# Patient Record
Sex: FEMALE | Race: WHITE | NOT HISPANIC OR LATINO
[De-identification: names, ages, dates, MRNs, and addresses within clinical notes are randomized per-mention and may not be internally consistent; named-entity substitution may affect disease eponyms.]

---

## 2018-06-07 ENCOUNTER — TRANSCRIPTION ENCOUNTER (OUTPATIENT)
Age: 53
End: 2018-06-07

## 2018-06-12 ENCOUNTER — RECORD ABSTRACTING (OUTPATIENT)
Age: 53
End: 2018-06-12

## 2018-06-12 DIAGNOSIS — R11.0 NAUSEA: ICD-10-CM

## 2018-06-12 DIAGNOSIS — Z23 ENCOUNTER FOR IMMUNIZATION: ICD-10-CM

## 2018-06-12 DIAGNOSIS — Z82.49 FAMILY HISTORY OF ISCHEMIC HEART DISEASE AND OTHER DISEASES OF THE CIRCULATORY SYSTEM: ICD-10-CM

## 2018-06-12 DIAGNOSIS — Z92.89 PERSONAL HISTORY OF OTHER MEDICAL TREATMENT: ICD-10-CM

## 2018-06-22 ENCOUNTER — APPOINTMENT (OUTPATIENT)
Dept: INTERNAL MEDICINE | Facility: CLINIC | Age: 53
End: 2018-06-22

## 2019-01-10 ENCOUNTER — RECORD ABSTRACTING (OUTPATIENT)
Age: 54
End: 2019-01-10

## 2019-01-15 ENCOUNTER — NON-APPOINTMENT (OUTPATIENT)
Age: 54
End: 2019-01-15

## 2019-01-15 ENCOUNTER — APPOINTMENT (OUTPATIENT)
Dept: INTERNAL MEDICINE | Facility: CLINIC | Age: 54
End: 2019-01-15
Payer: COMMERCIAL

## 2019-01-15 VITALS
SYSTOLIC BLOOD PRESSURE: 120 MMHG | DIASTOLIC BLOOD PRESSURE: 82 MMHG | WEIGHT: 138 LBS | HEIGHT: 60 IN | TEMPERATURE: 99 F | BODY MASS INDEX: 27.09 KG/M2

## 2019-01-15 DIAGNOSIS — R07.89 OTHER CHEST PAIN: ICD-10-CM

## 2019-01-15 PROCEDURE — 93000 ELECTROCARDIOGRAM COMPLETE: CPT

## 2019-01-15 PROCEDURE — 99214 OFFICE O/P EST MOD 30 MIN: CPT | Mod: 25

## 2019-01-15 NOTE — HISTORY OF PRESENT ILLNESS
[FreeTextEntry8] : She had hurt her back and has stopped exercising. When she saw her gynecologist in the fall who checked her cholesterol was high. This motivated her to get back into the gym. She's been exercising vigorously and can run up to 2-1/2 miles. Despite that in the last few days she's noticed shortness of breath and dizziness when [place] the city. She had some chest tightness and pressure. She is concerned because her father had a heart attack in his 50s. She has never seen a cardiologist.

## 2019-01-15 NOTE — PHYSICAL EXAM
[No Acute Distress] : no acute distress [Normal Sclera/Conjunctiva] : normal sclera/conjunctiva [Normal Outer Ear/Nose] : the outer ears and nose were normal in appearance [No JVD] : no jugular venous distention [No Respiratory Distress] : no respiratory distress  [Clear to Auscultation] : lungs were clear to auscultation bilaterally [Normal Rate] : normal rate  [Regular Rhythm] : with a regular rhythm [No Carotid Bruits] : no carotid bruits [No Edema] : there was no peripheral edema [Normal Anterior Cervical Nodes] : no anterior cervical lymphadenopathy [Kyphosis] : no kyphosis [Grossly Normal Strength/Tone] : grossly normal strength/tone [No Rash] : no rash [No Focal Deficits] : no focal deficits [Normal Affect] : the affect was normal

## 2019-01-15 NOTE — HEALTH RISK ASSESSMENT
[Patient reported mammogram was normal] : Patient reported mammogram was normal [MammogramDate] : 12/18

## 2019-01-15 NOTE — PLAN
[FreeTextEntry1] : Her EKG is unchanged from prior. She can exercise vigorously without chest pain. Given her family history however I recommend she see a cardiologist and a stress test.\par She will return to office after this for a physical.

## 2019-03-01 ENCOUNTER — APPOINTMENT (OUTPATIENT)
Dept: CARDIOLOGY | Facility: CLINIC | Age: 54
End: 2019-03-01
Payer: COMMERCIAL

## 2019-03-01 ENCOUNTER — NON-APPOINTMENT (OUTPATIENT)
Age: 54
End: 2019-03-01

## 2019-03-01 VITALS
OXYGEN SATURATION: 98 % | HEIGHT: 60 IN | WEIGHT: 145 LBS | DIASTOLIC BLOOD PRESSURE: 72 MMHG | SYSTOLIC BLOOD PRESSURE: 116 MMHG | BODY MASS INDEX: 28.47 KG/M2 | HEART RATE: 76 BPM

## 2019-03-01 DIAGNOSIS — R06.02 SHORTNESS OF BREATH: ICD-10-CM

## 2019-03-01 DIAGNOSIS — Z82.49 FAMILY HISTORY OF ISCHEMIC HEART DISEASE AND OTHER DISEASES OF THE CIRCULATORY SYSTEM: ICD-10-CM

## 2019-03-01 PROCEDURE — 99244 OFF/OP CNSLTJ NEW/EST MOD 40: CPT | Mod: 25

## 2019-03-01 PROCEDURE — 93000 ELECTROCARDIOGRAM COMPLETE: CPT

## 2019-03-01 NOTE — HISTORY OF PRESENT ILLNESS
[FreeTextEntry1] : Dang Finley is a healthy 53 year old woman with a history of being overweight, elevated cholesterol, and family history of premature coronary artery disease / myocardial infarction (father) who presents for cardiology consultation.  She has no known history of heart disease and describes a normal nuclear stress test approximately 10 years ago. However, she has noticed recent onset of dyspnea while climbing [place] in [facility]; dyspnea is occasionally associated with mild lightheadedness / near syncope.  She also describes a sporadic chest discomfort - not typical of angina.  She does not experience dyspnea with other aerobic activities, such as using an elliptical machine or walking on a treadmill -- seems to only occur with climbing stairs.  Symptoms improved with rest.  She describes several psychosocial stressors - work-related + divorce proceedings + parenthood.

## 2019-03-01 NOTE — REVIEW OF SYSTEMS
[Headache] : headache [Eyeglasses] : currently wearing eyeglasses [Shortness Of Breath] : shortness of breath [Dyspnea on exertion] : dyspnea during exertion [Chest Pain] : chest pain [Palpitations] : palpitations [Lower Back Pain] : lower back pain [Under Stress] : under stress [Negative] : Heme/Lymph

## 2019-03-01 NOTE — PHYSICAL EXAM
[Well Groomed] : well groomed [General Appearance - In No Acute Distress] : no acute distress [Eyelids - No Xanthelasma] : the eyelids demonstrated no xanthelasmas [No Oral Pallor] : no oral pallor [FreeTextEntry1] : No carotid bruit [Heart Rate And Rhythm] : heart rate and rhythm were normal [Heart Sounds] : normal S1 and S2 [Murmurs] : no murmurs present [Edema] : no peripheral edema present [Respiration, Rhythm And Depth] : normal respiratory rhythm and effort [Auscultation Breath Sounds / Voice Sounds] : lungs were clear to auscultation bilaterally [Bowel Sounds] : normal bowel sounds [Abdomen Soft] : soft [Abdomen Tenderness] : non-tender [Gait - Sufficient For Exercise Testing] : the gait was sufficient for exercise testing [Nail Clubbing] : no clubbing of the fingernails [Cyanosis, Localized] : no localized cyanosis [] : no rash [Oriented To Time, Place, And Person] : oriented to person, place, and time [Impaired Insight] : insight and judgment were intact [Affect] : the affect was normal [Mood] : the mood was normal

## 2019-03-11 ENCOUNTER — APPOINTMENT (OUTPATIENT)
Dept: INTERNAL MEDICINE | Facility: CLINIC | Age: 54
End: 2019-03-11
Payer: COMMERCIAL

## 2019-03-11 VITALS
TEMPERATURE: 98.1 F | WEIGHT: 138 LBS | SYSTOLIC BLOOD PRESSURE: 100 MMHG | DIASTOLIC BLOOD PRESSURE: 80 MMHG | HEIGHT: 59 IN | BODY MASS INDEX: 27.82 KG/M2

## 2019-03-11 PROCEDURE — 99396 PREV VISIT EST AGE 40-64: CPT | Mod: 25

## 2019-03-11 PROCEDURE — 36415 COLL VENOUS BLD VENIPUNCTURE: CPT

## 2019-03-11 NOTE — PHYSICAL EXAM
[No Acute Distress] : no acute distress [Normal Sclera/Conjunctiva] : normal sclera/conjunctiva [Normal Outer Ear/Nose] : the outer ears and nose were normal in appearance [No JVD] : no jugular venous distention [No Respiratory Distress] : no respiratory distress  [Clear to Auscultation] : lungs were clear to auscultation bilaterally [Normal Rate] : normal rate  [Regular Rhythm] : with a regular rhythm [No Carotid Bruits] : no carotid bruits [No Edema] : there was no peripheral edema [Normal Anterior Cervical Nodes] : no anterior cervical lymphadenopathy [Grossly Normal Strength/Tone] : grossly normal strength/tone [No Rash] : no rash [No Focal Deficits] : no focal deficits [Normal Affect] : the affect was normal [Kyphosis] : no kyphosis

## 2019-03-12 ENCOUNTER — TRANSCRIPTION ENCOUNTER (OUTPATIENT)
Age: 54
End: 2019-03-12

## 2019-03-12 LAB
ALBUMIN SERPL ELPH-MCNC: 4.6 G/DL
ALP BLD-CCNC: 65 U/L
ALT SERPL-CCNC: 24 U/L
ANION GAP SERPL CALC-SCNC: 12 MMOL/L
APPEARANCE: CLEAR
AST SERPL-CCNC: 17 U/L
BACTERIA: NEGATIVE
BASOPHILS # BLD AUTO: 0.05 K/UL
BASOPHILS NFR BLD AUTO: 0.6 %
BILIRUB SERPL-MCNC: 0.3 MG/DL
BILIRUBIN URINE: NEGATIVE
BLOOD URINE: NEGATIVE
BUN SERPL-MCNC: 13 MG/DL
CALCIUM SERPL-MCNC: 9.6 MG/DL
CHLORIDE SERPL-SCNC: 104 MMOL/L
CHOLEST SERPL-MCNC: 215 MG/DL
CHOLEST/HDLC SERPL: 3.6 RATIO
CO2 SERPL-SCNC: 24 MMOL/L
COLOR: YELLOW
CREAT SERPL-MCNC: 0.91 MG/DL
EOSINOPHIL # BLD AUTO: 0.1 K/UL
EOSINOPHIL NFR BLD AUTO: 1.1 %
GLUCOSE QUALITATIVE U: NEGATIVE
GLUCOSE SERPL-MCNC: 88 MG/DL
HBA1C MFR BLD HPLC: 5 %
HCT VFR BLD CALC: 45.6 %
HDLC SERPL-MCNC: 60 MG/DL
HGB BLD-MCNC: 14.4 G/DL
HYALINE CASTS: 3 /LPF
IMM GRANULOCYTES NFR BLD AUTO: 0.2 %
KETONES URINE: NEGATIVE
LDLC SERPL CALC-MCNC: 140 MG/DL
LEUKOCYTE ESTERASE URINE: NEGATIVE
LYMPHOCYTES # BLD AUTO: 1.78 K/UL
LYMPHOCYTES NFR BLD AUTO: 19.9 %
MAN DIFF?: NORMAL
MCHC RBC-ENTMCNC: 28.3 PG
MCHC RBC-ENTMCNC: 31.6 GM/DL
MCV RBC AUTO: 89.8 FL
MICROSCOPIC-UA: NORMAL
MONOCYTES # BLD AUTO: 0.63 K/UL
MONOCYTES NFR BLD AUTO: 7.1 %
NEUTROPHILS # BLD AUTO: 6.35 K/UL
NEUTROPHILS NFR BLD AUTO: 71.1 %
NITRITE URINE: NEGATIVE
PH URINE: 5.5
PLATELET # BLD AUTO: 283 K/UL
POTASSIUM SERPL-SCNC: 4.8 MMOL/L
PROT SERPL-MCNC: 7.3 G/DL
PROTEIN URINE: NORMAL
RBC # BLD: 5.08 M/UL
RBC # FLD: 13.6 %
RED BLOOD CELLS URINE: 6 /HPF
SODIUM SERPL-SCNC: 140 MMOL/L
SPECIFIC GRAVITY URINE: 1.03
SQUAMOUS EPITHELIAL CELLS: 4 /HPF
TRIGL SERPL-MCNC: 74 MG/DL
TSH SERPL-ACNC: 1.64 UIU/ML
UROBILINOGEN URINE: NORMAL
VIT B12 SERPL-MCNC: 496 PG/ML
WBC # FLD AUTO: 8.93 K/UL
WHITE BLOOD CELLS URINE: 4 /HPF

## 2019-03-13 ENCOUNTER — RESULT REVIEW (OUTPATIENT)
Age: 54
End: 2019-03-13

## 2019-03-15 ENCOUNTER — MESSAGE (OUTPATIENT)
Age: 54
End: 2019-03-15

## 2019-03-27 ENCOUNTER — APPOINTMENT (OUTPATIENT)
Dept: CARDIOLOGY | Facility: CLINIC | Age: 54
End: 2019-03-27
Payer: COMMERCIAL

## 2019-03-27 PROCEDURE — 93015 CV STRESS TEST SUPVJ I&R: CPT

## 2019-03-27 PROCEDURE — 93306 TTE W/DOPPLER COMPLETE: CPT

## 2020-04-14 ENCOUNTER — APPOINTMENT (OUTPATIENT)
Dept: INTERNAL MEDICINE | Facility: CLINIC | Age: 55
End: 2020-04-14
Payer: COMMERCIAL

## 2020-04-14 VITALS — DIASTOLIC BLOOD PRESSURE: 80 MMHG | OXYGEN SATURATION: 99 % | SYSTOLIC BLOOD PRESSURE: 138 MMHG | HEART RATE: 68 BPM

## 2020-04-14 VITALS
TEMPERATURE: 97.8 F | SYSTOLIC BLOOD PRESSURE: 140 MMHG | DIASTOLIC BLOOD PRESSURE: 90 MMHG | WEIGHT: 138 LBS | BODY MASS INDEX: 27.82 KG/M2 | HEIGHT: 59 IN

## 2020-04-14 DIAGNOSIS — E28.2 POLYCYSTIC OVARIAN SYNDROME: ICD-10-CM

## 2020-04-14 DIAGNOSIS — L65.9 NONSCARRING HAIR LOSS, UNSPECIFIED: ICD-10-CM

## 2020-04-14 PROCEDURE — 99214 OFFICE O/P EST MOD 30 MIN: CPT | Mod: 25

## 2020-04-14 PROCEDURE — 36415 COLL VENOUS BLD VENIPUNCTURE: CPT

## 2020-04-14 RX ORDER — MELOXICAM 15 MG/1
15 TABLET ORAL
Refills: 0 | Status: DISCONTINUED | COMMUNITY
End: 2020-04-14

## 2020-04-14 NOTE — REVIEW OF SYSTEMS
[Negative] : Heme/Lymph [Dysuria] : no dysuria [Incontinence] : no incontinence [Hematuria] : no hematuria [Frequency] : frequency [Suicidal] : not suicidal [Insomnia] : no insomnia [Anxiety] : anxiety [Depression] : no depression

## 2020-04-14 NOTE — PLAN
[FreeTextEntry1] : I suspect her hair loss is due to emotional stress.\par To be thorough, will order comprehensive lab workup to rule out metabolic etiology.\par She had mild hyperlipidemia last yr - recheck lipid profile and A1c.\par

## 2020-04-14 NOTE — PHYSICAL EXAM
[No Acute Distress] : no acute distress [No Respiratory Distress] : no respiratory distress  [Clear to Auscultation] : lungs were clear to auscultation bilaterally [Normal Rate] : normal rate  [Regular Rhythm] : with a regular rhythm [No Carotid Bruits] : no carotid bruits [No Edema] : there was no peripheral edema [Grossly Normal Strength/Tone] : grossly normal strength/tone [No Rash] : no rash [Normal Affect] : the affect was normal [Well Nourished] : well nourished [Well-Appearing] : well-appearing [Supple] : supple [No Lymphadenopathy] : no lymphadenopathy [Normal S1, S2] : normal S1 and S2 [No Murmur] : no murmur heard [Soft] : abdomen soft [Non Tender] : non-tender [Normal Gait] : normal gait [Normal Mood] : the mood was normal [de-identified] : friendly

## 2020-04-20 LAB
ALBUMIN SERPL ELPH-MCNC: 4.8 G/DL
ALP BLD-CCNC: 70 U/L
ALT SERPL-CCNC: 22 U/L
ANION GAP SERPL CALC-SCNC: 13 MMOL/L
AST SERPL-CCNC: 18 U/L
BASOPHILS # BLD AUTO: 0.04 K/UL
BASOPHILS NFR BLD AUTO: 0.5 %
BILIRUB SERPL-MCNC: 0.4 MG/DL
BUN SERPL-MCNC: 16 MG/DL
CALCIUM SERPL-MCNC: 10.1 MG/DL
CHLORIDE SERPL-SCNC: 104 MMOL/L
CHOLEST SERPL-MCNC: 225 MG/DL
CHOLEST/HDLC SERPL: 3.8 RATIO
CO2 SERPL-SCNC: 23 MMOL/L
CREAT SERPL-MCNC: 0.94 MG/DL
DHEA-S SERPL-MCNC: 109 UG/DL
EOSINOPHIL # BLD AUTO: 0.06 K/UL
EOSINOPHIL NFR BLD AUTO: 0.7 %
ESTIMATED AVERAGE GLUCOSE: 97 MG/DL
ESTRADIOL SERPL-MCNC: 35 PG/ML
GLUCOSE SERPL-MCNC: 85 MG/DL
HBA1C MFR BLD HPLC: 5 %
HCT VFR BLD CALC: 43.1 %
HDLC SERPL-MCNC: 60 MG/DL
HGB BLD-MCNC: 13.9 G/DL
IMM GRANULOCYTES NFR BLD AUTO: 0.4 %
LDLC SERPL CALC-MCNC: 144 MG/DL
LYMPHOCYTES # BLD AUTO: 1.51 K/UL
LYMPHOCYTES NFR BLD AUTO: 17.8 %
MAN DIFF?: NORMAL
MCHC RBC-ENTMCNC: 28.4 PG
MCHC RBC-ENTMCNC: 32.3 GM/DL
MCV RBC AUTO: 88.1 FL
MONOCYTES # BLD AUTO: 0.41 K/UL
MONOCYTES NFR BLD AUTO: 4.8 %
NEUTROPHILS # BLD AUTO: 6.41 K/UL
NEUTROPHILS NFR BLD AUTO: 75.8 %
PLATELET # BLD AUTO: 389 K/UL
POTASSIUM SERPL-SCNC: 5 MMOL/L
PROT SERPL-MCNC: 7.2 G/DL
RBC # BLD: 4.89 M/UL
RBC # FLD: 13.9 %
SODIUM SERPL-SCNC: 140 MMOL/L
TESTOST BND SERPL-MCNC: 0.9 PG/ML
TESTOST SERPL-MCNC: 18.7 NG/DL
TRIGL SERPL-MCNC: 106 MG/DL
TSH SERPL-ACNC: 1.17 UIU/ML
VIT B12 SERPL-MCNC: 597 PG/ML
WBC # FLD AUTO: 8.46 K/UL

## 2020-04-21 ENCOUNTER — APPOINTMENT (OUTPATIENT)
Dept: INTERNAL MEDICINE | Facility: CLINIC | Age: 55
End: 2020-04-21

## 2020-05-18 DIAGNOSIS — Z11.59 ENCOUNTER FOR SCREENING FOR OTHER VIRAL DISEASES: ICD-10-CM

## 2020-06-02 ENCOUNTER — TRANSCRIPTION ENCOUNTER (OUTPATIENT)
Age: 55
End: 2020-06-02

## 2020-10-11 NOTE — H&P PST ADULT - HISTORY OF PRESENT ILLNESS
pt is a 54 y/o wf, [id number], lmp in september, with regular menses at age 55, for endometrial sampling due to this history. As 3 previous c/s, for sampling under anesthesia.   pmh: adult form congenital adrenal hyperplasia, pcos, cervical dysplasia, hypercholessterolinemia  psh: Hleep, 3 c/s  nkda  Denied tobacco use  FH: Dad myocardial infarction  OB/GYN: Menarche age 13 with monthly cycles lasting 5 days. No h/o ovarian cysts or myomas. Cervical dysplasia in 1993 treated by hleep, Pap 12/2019 wnl and hpv neg. 3 c/s.  exam:ht 4-10-1/2, weight 143.5. bp 90/60. Heart reg rate and rhythym, Lungs cta . Abd soft, nt,nd s rbnd or guard. Ext: no cyanosis/clibbing/edema. Breast wnl, Neck wnl. Pelvic: nl ext fren, nl cervix/uterus/adnexa.

## 2020-10-13 ENCOUNTER — OUTPATIENT (OUTPATIENT)
Dept: OUTPATIENT SERVICES | Facility: HOSPITAL | Age: 55
LOS: 1 days | End: 2020-10-13
Payer: COMMERCIAL

## 2020-10-13 DIAGNOSIS — Z11.59 ENCOUNTER FOR SCREENING FOR OTHER VIRAL DISEASES: ICD-10-CM

## 2020-10-13 LAB — SARS-COV-2 RNA SPEC QL NAA+PROBE: SIGNIFICANT CHANGE UP

## 2020-10-13 PROCEDURE — U0003: CPT

## 2020-10-14 ENCOUNTER — TRANSCRIPTION ENCOUNTER (OUTPATIENT)
Age: 55
End: 2020-10-14

## 2020-10-15 ENCOUNTER — RESULT REVIEW (OUTPATIENT)
Age: 55
End: 2020-10-15

## 2020-10-15 ENCOUNTER — OUTPATIENT (OUTPATIENT)
Dept: OUTPATIENT SERVICES | Facility: HOSPITAL | Age: 55
LOS: 1 days | End: 2020-10-15
Payer: COMMERCIAL

## 2020-10-15 VITALS
TEMPERATURE: 98 F | SYSTOLIC BLOOD PRESSURE: 107 MMHG | HEART RATE: 60 BPM | OXYGEN SATURATION: 97 % | DIASTOLIC BLOOD PRESSURE: 64 MMHG | RESPIRATION RATE: 15 BRPM

## 2020-10-15 VITALS
HEIGHT: 59 IN | RESPIRATION RATE: 14 BRPM | TEMPERATURE: 98 F | WEIGHT: 138.01 LBS | SYSTOLIC BLOOD PRESSURE: 147 MMHG | HEART RATE: 56 BPM | DIASTOLIC BLOOD PRESSURE: 79 MMHG | OXYGEN SATURATION: 99 %

## 2020-10-15 DIAGNOSIS — N95.0 POSTMENOPAUSAL BLEEDING: ICD-10-CM

## 2020-10-15 LAB — HCG UR QL: NEGATIVE — SIGNIFICANT CHANGE UP

## 2020-10-15 PROCEDURE — 88305 TISSUE EXAM BY PATHOLOGIST: CPT

## 2020-10-15 PROCEDURE — 86900 BLOOD TYPING SEROLOGIC ABO: CPT

## 2020-10-15 PROCEDURE — 86901 BLOOD TYPING SEROLOGIC RH(D): CPT

## 2020-10-15 PROCEDURE — 58558 HYSTEROSCOPY BIOPSY: CPT

## 2020-10-15 PROCEDURE — 86850 RBC ANTIBODY SCREEN: CPT

## 2020-10-15 PROCEDURE — 36415 COLL VENOUS BLD VENIPUNCTURE: CPT

## 2020-10-15 PROCEDURE — 88305 TISSUE EXAM BY PATHOLOGIST: CPT | Mod: 26

## 2020-10-15 PROCEDURE — 81025 URINE PREGNANCY TEST: CPT

## 2020-10-15 RX ORDER — SODIUM CHLORIDE 9 MG/ML
1000 INJECTION, SOLUTION INTRAVENOUS
Refills: 0 | Status: DISCONTINUED | OUTPATIENT
Start: 2020-10-15 | End: 2020-10-15

## 2020-10-15 RX ORDER — ONDANSETRON 8 MG/1
4 TABLET, FILM COATED ORAL ONCE
Refills: 0 | Status: DISCONTINUED | OUTPATIENT
Start: 2020-10-15 | End: 2020-10-15

## 2020-10-15 RX ORDER — OXYCODONE HYDROCHLORIDE 5 MG/1
5 TABLET ORAL ONCE
Refills: 0 | Status: DISCONTINUED | OUTPATIENT
Start: 2020-10-15 | End: 2020-10-15

## 2020-10-15 RX ORDER — HYDROMORPHONE HYDROCHLORIDE 2 MG/ML
0.5 INJECTION INTRAMUSCULAR; INTRAVENOUS; SUBCUTANEOUS
Refills: 0 | Status: DISCONTINUED | OUTPATIENT
Start: 2020-10-15 | End: 2020-10-15

## 2020-10-15 RX ADMIN — SODIUM CHLORIDE 60 MILLILITER(S): 9 INJECTION, SOLUTION INTRAVENOUS at 09:43

## 2020-10-15 NOTE — BRIEF OPERATIVE NOTE - NSICDXBRIEFPOSTOP_GEN_ALL_CORE_FT
POST-OP DIAGNOSIS:  Polyp, cervix 15-Oct-2020 11:53:54  Param Pulido  Postmenopause bleeding 15-Oct-2020 11:53:43  Param Pulido

## 2020-10-15 NOTE — BRIEF OPERATIVE NOTE - NSICDXBRIEFPROCEDURE_GEN_ALL_CORE_FT
PROCEDURES:  Cervical polypectomy 15-Oct-2020 11:53:16  Param Pulido  Hysteroscopy with biopsy 15-Oct-2020 11:53:01  Param Pulido

## 2020-10-15 NOTE — ASU DISCHARGE PLAN (ADULT/PEDIATRIC) - CARE PROVIDER_API CALL
Param Pulido  OBSTETRICS AND GYNECOLOGY  [address] [place] [address]  Phone: [phone]  Fax: [phone]  Follow Up Time:

## 2020-10-16 LAB — SURGICAL PATHOLOGY STUDY: SIGNIFICANT CHANGE UP

## 2020-11-09 ENCOUNTER — NON-APPOINTMENT (OUTPATIENT)
Age: 55
End: 2020-11-09

## 2020-11-09 ENCOUNTER — APPOINTMENT (OUTPATIENT)
Dept: INTERNAL MEDICINE | Facility: CLINIC | Age: 55
End: 2020-11-09
Payer: COMMERCIAL

## 2020-11-09 VITALS
OXYGEN SATURATION: 99 % | HEIGHT: 59 IN | SYSTOLIC BLOOD PRESSURE: 110 MMHG | HEART RATE: 60 BPM | BODY MASS INDEX: 27.82 KG/M2 | WEIGHT: 138 LBS | DIASTOLIC BLOOD PRESSURE: 80 MMHG | TEMPERATURE: 97.8 F | RESPIRATION RATE: 18 BRPM

## 2020-11-09 DIAGNOSIS — Z63.5 DISRUPTION OF FAMILY BY SEPARATION AND DIVORCE: ICD-10-CM

## 2020-11-09 DIAGNOSIS — Z01.84 ENCOUNTER FOR ANTIBODY RESPONSE EXAMINATION: ICD-10-CM

## 2020-11-09 PROCEDURE — 36415 COLL VENOUS BLD VENIPUNCTURE: CPT

## 2020-11-09 PROCEDURE — 99072 ADDL SUPL MATRL&STAF TM PHE: CPT

## 2020-11-09 PROCEDURE — 99396 PREV VISIT EST AGE 40-64: CPT | Mod: 25

## 2020-11-09 PROCEDURE — 93000 ELECTROCARDIOGRAM COMPLETE: CPT

## 2020-11-09 SDOH — SOCIAL STABILITY - SOCIAL INSECURITY: DISRUPTION OF FAMILY BY SEPARATION AND DIVORCE: Z63.5

## 2020-11-09 NOTE — PHYSICAL EXAM
[No Acute Distress] : no acute distress [Well Nourished] : well nourished [Well-Appearing] : well-appearing [Normal Sclera/Conjunctiva] : normal sclera/conjunctiva [EOMI] : extraocular movements intact [Normal Outer Ear/Nose] : the outer ears and nose were normal in appearance [Normal TMs] : both tympanic membranes were normal [Supple] : supple [No Lymphadenopathy] : no lymphadenopathy [No Respiratory Distress] : no respiratory distress  [Clear to Auscultation] : lungs were clear to auscultation bilaterally [Normal Rate] : normal rate  [Regular Rhythm] : with a regular rhythm [Normal S1, S2] : normal S1 and S2 [No Murmur] : no murmur heard [No Carotid Bruits] : no carotid bruits [No Edema] : there was no peripheral edema [Soft] : abdomen soft [Non Tender] : non-tender [Normal Posterior Cervical Nodes] : no posterior cervical lymphadenopathy [Normal Anterior Cervical Nodes] : no anterior cervical lymphadenopathy [No Spinal Tenderness] : no spinal tenderness [No Joint Swelling] : no joint swelling [Grossly Normal Strength/Tone] : grossly normal strength/tone [No Rash] : no rash [Normal Gait] : normal gait [Normal Affect] : the affect was normal [Normal Mood] : the mood was normal [de-identified] : friendly [de-identified] : defer to GYN

## 2020-11-09 NOTE — HISTORY OF PRESENT ILLNESS
[FreeTextEntry1] : physical [de-identified] : Patient comes for an annual exam.\par \par She had D&C last month for vaginal bleeding, found benign endocervix polyp. Still has regular menses. \par She is due to see her dermatologist Dr. Perry. \par She has GERD symptoms including abnormal sensation in throat. Had EGD in 2016 which was reportedly normal. She does not take medication. She watches diet but could do better.\par Her hair loss has resolved. She attributed it to stress.

## 2020-11-09 NOTE — PLAN
[FreeTextEntry1] : Check routine fasting labs.\par Discussed diet, exercise, and weight maintenance. \par She declined flu shot.\par Scheduled for mammogram next month, will send us the report. Also seeing GYN next month for annual.\par Colonoscopy 2016 - likely repeat 2026.\par She has mild hyperlipidemia, check fasting lipid panel. She will continue to watch diet, continue exercise.\par She likely has GERD, start trial of Pepcid 40 mg daily. GERD diet precautions discussed.\par \par RTO in 1 yr for annual exam or earlier PRN for acute care.\par \par \par

## 2020-11-09 NOTE — HEALTH RISK ASSESSMENT
[Excellent] : ~his/her~  mood as  excellent [No] : In the past 12 months have you used drugs other than those required for medical reasons? No [No falls in past year] : Patient reported no falls in the past year [0] : 2) Feeling down, depressed, or hopeless: Not at all (0) [HIV test declined] : HIV test declined [Patient reported colonoscopy was normal] : Patient reported colonoscopy was normal [Patient reported mammogram was normal] : Patient reported mammogram was normal [Patient reported PAP Smear was normal] : Patient reported PAP Smear was normal [] : No [de-identified] : running, weights [MammogramDate] : 12/19 [PapSmearDate] : 12/19 [ColonoscopyDate] : 10/16 [HepatitisCDate] : 09/17

## 2020-11-11 ENCOUNTER — TRANSCRIPTION ENCOUNTER (OUTPATIENT)
Age: 55
End: 2020-11-11

## 2020-11-11 LAB
25(OH)D3 SERPL-MCNC: 30.6 NG/ML
ALBUMIN SERPL ELPH-MCNC: 4.6 G/DL
ALP BLD-CCNC: 76 U/L
ALT SERPL-CCNC: 18 U/L
ANION GAP SERPL CALC-SCNC: 9 MMOL/L
APPEARANCE: CLEAR
AST SERPL-CCNC: 16 U/L
BACTERIA: NEGATIVE
BASOPHILS # BLD AUTO: 0.05 K/UL
BASOPHILS NFR BLD AUTO: 0.6 %
BILIRUB SERPL-MCNC: 0.4 MG/DL
BILIRUBIN URINE: NEGATIVE
BLOOD URINE: NORMAL
BUN SERPL-MCNC: 13 MG/DL
CALCIUM SERPL-MCNC: 9.3 MG/DL
CHLORIDE SERPL-SCNC: 105 MMOL/L
CHOLEST SERPL-MCNC: 241 MG/DL
CO2 SERPL-SCNC: 24 MMOL/L
COLOR: NORMAL
CREAT SERPL-MCNC: 0.86 MG/DL
EOSINOPHIL # BLD AUTO: 0.06 K/UL
EOSINOPHIL NFR BLD AUTO: 0.8 %
ESTIMATED AVERAGE GLUCOSE: 103 MG/DL
GLUCOSE QUALITATIVE U: NEGATIVE
GLUCOSE SERPL-MCNC: 89 MG/DL
HBA1C MFR BLD HPLC: 5.2 %
HCT VFR BLD CALC: 43.1 %
HDLC SERPL-MCNC: 61 MG/DL
HGB BLD-MCNC: 14.1 G/DL
HYALINE CASTS: 1 /LPF
IMM GRANULOCYTES NFR BLD AUTO: 0.1 %
KETONES URINE: NEGATIVE
LDLC SERPL CALC-MCNC: 166 MG/DL
LEUKOCYTE ESTERASE URINE: NEGATIVE
LYMPHOCYTES # BLD AUTO: 2.28 K/UL
LYMPHOCYTES NFR BLD AUTO: 28.9 %
MAN DIFF?: NORMAL
MCHC RBC-ENTMCNC: 29.3 PG
MCHC RBC-ENTMCNC: 32.7 GM/DL
MCV RBC AUTO: 89.6 FL
MICROSCOPIC-UA: NORMAL
MONOCYTES # BLD AUTO: 0.48 K/UL
MONOCYTES NFR BLD AUTO: 6.1 %
NEUTROPHILS # BLD AUTO: 5 K/UL
NEUTROPHILS NFR BLD AUTO: 63.5 %
NITRITE URINE: NEGATIVE
NONHDLC SERPL-MCNC: 180 MG/DL
PH URINE: 5.5
PLATELET # BLD AUTO: 396 K/UL
POTASSIUM SERPL-SCNC: 4.3 MMOL/L
PROT SERPL-MCNC: 6.9 G/DL
PROTEIN URINE: NEGATIVE
RBC # BLD: 4.81 M/UL
RBC # FLD: 13.9 %
RED BLOOD CELLS URINE: 4 /HPF
SARS-COV-2 IGG SERPL IA-ACNC: 0.09 INDEX
SARS-COV-2 IGG SERPL QL IA: NEGATIVE
SODIUM SERPL-SCNC: 138 MMOL/L
SPECIFIC GRAVITY URINE: 1.03
SQUAMOUS EPITHELIAL CELLS: 1 /HPF
TRIGL SERPL-MCNC: 72 MG/DL
TSH SERPL-ACNC: 1.23 UIU/ML
UROBILINOGEN URINE: NORMAL
VIT B12 SERPL-MCNC: 458 PG/ML
WBC # FLD AUTO: 7.88 K/UL
WHITE BLOOD CELLS URINE: 1 /HPF

## 2020-11-13 ENCOUNTER — TRANSCRIPTION ENCOUNTER (OUTPATIENT)
Age: 55
End: 2020-11-13

## 2021-01-05 ENCOUNTER — APPOINTMENT (OUTPATIENT)
Dept: INTERNAL MEDICINE | Facility: CLINIC | Age: 56
End: 2021-01-05
Payer: COMMERCIAL

## 2021-01-05 ENCOUNTER — TRANSCRIPTION ENCOUNTER (OUTPATIENT)
Age: 56
End: 2021-01-05

## 2021-01-05 VITALS
SYSTOLIC BLOOD PRESSURE: 130 MMHG | RESPIRATION RATE: 14 BRPM | DIASTOLIC BLOOD PRESSURE: 80 MMHG | OXYGEN SATURATION: 96 % | TEMPERATURE: 100.1 F | HEART RATE: 92 BPM

## 2021-01-05 DIAGNOSIS — Z03.818 ENCOUNTER FOR OBSERVATION FOR SUSPECTED EXPOSURE TO OTHER BIOLOGICAL AGENTS RULED OUT: ICD-10-CM

## 2021-01-05 DIAGNOSIS — R50.9 FEVER, UNSPECIFIED: ICD-10-CM

## 2021-01-05 PROCEDURE — 99072 ADDL SUPL MATRL&STAF TM PHE: CPT

## 2021-01-05 PROCEDURE — 99213 OFFICE O/P EST LOW 20 MIN: CPT

## 2021-01-05 NOTE — HISTORY OF PRESENT ILLNESS
[Patient presents to the office today for COVID-19 evaluation and testing.] : Patient presents to the office today for COVID-19 evaluation and testing. [With Confirmed Case] : patient exposed to a confirmed case of COVID-19 [None] : none [Clear] : clear [Speaks in full sentences] : speaks in full sentences [Normal O2 sat at rest] : normal O2 sat at rest [Grossly normal, interacts, not tired or weak] : grossly normal, interacts, not tired or weak [Discharged with current Quarantine instructions and advised of signs of worsening illness.] : Patient discharged with current quarantine instructions and advised of signs of worsening illness. Patient told to seek emergent care if symptoms occur. [] : mild cough [FreeTextEntry1] : Patient comes for COVID testing. She was exposed to her brother on [address] who tested positive for COVID-19 on 12/30. She had PCR test on 12/31 and was negative on 1/3. She has headache, sore throat, and sinus pressure. Has ear pressure. She tried Motrin but without relief. She developed mild cough last night. [TextBox_107] : Send RVP w/ COVID PCR.\par Advised on quarantine x 10 days if COVID positive. \par Use NSAID's for fever and body aches.\par Rest and drink fluids.\par Call office PRN with any worsening sx or other concerns.

## 2021-01-06 ENCOUNTER — TRANSCRIPTION ENCOUNTER (OUTPATIENT)
Age: 56
End: 2021-01-06

## 2021-01-06 LAB
RAPID RVP RESULT: DETECTED
SARS-COV-2 RNA PNL RESP NAA+PROBE: DETECTED

## 2021-01-07 ENCOUNTER — TRANSCRIPTION ENCOUNTER (OUTPATIENT)
Age: 56
End: 2021-01-07

## 2021-01-11 ENCOUNTER — TRANSCRIPTION ENCOUNTER (OUTPATIENT)
Age: 56
End: 2021-01-11

## 2021-01-11 DIAGNOSIS — J32.9 CHRONIC SINUSITIS, UNSPECIFIED: ICD-10-CM

## 2021-01-14 ENCOUNTER — TRANSCRIPTION ENCOUNTER (OUTPATIENT)
Age: 56
End: 2021-01-14

## 2021-03-07 ENCOUNTER — TRANSCRIPTION ENCOUNTER (OUTPATIENT)
Age: 56
End: 2021-03-07

## 2021-03-08 ENCOUNTER — TRANSCRIPTION ENCOUNTER (OUTPATIENT)
Age: 56
End: 2021-03-08

## 2021-03-09 ENCOUNTER — NON-APPOINTMENT (OUTPATIENT)
Age: 56
End: 2021-03-09

## 2021-03-09 ENCOUNTER — TRANSCRIPTION ENCOUNTER (OUTPATIENT)
Age: 56
End: 2021-03-09

## 2021-03-10 ENCOUNTER — APPOINTMENT (OUTPATIENT)
Dept: CARDIOLOGY | Facility: CLINIC | Age: 56
End: 2021-03-10
Payer: COMMERCIAL

## 2021-03-10 ENCOUNTER — NON-APPOINTMENT (OUTPATIENT)
Age: 56
End: 2021-03-10

## 2021-03-10 VITALS
RESPIRATION RATE: 14 BRPM | WEIGHT: 138 LBS | OXYGEN SATURATION: 100 % | SYSTOLIC BLOOD PRESSURE: 132 MMHG | BODY MASS INDEX: 27.82 KG/M2 | HEIGHT: 59 IN | HEART RATE: 66 BPM | DIASTOLIC BLOOD PRESSURE: 80 MMHG

## 2021-03-10 VITALS
HEART RATE: 66 BPM | BODY MASS INDEX: 27.82 KG/M2 | OXYGEN SATURATION: 100 % | WEIGHT: 138 LBS | SYSTOLIC BLOOD PRESSURE: 150 MMHG | DIASTOLIC BLOOD PRESSURE: 91 MMHG | HEIGHT: 59 IN

## 2021-03-10 VITALS — DIASTOLIC BLOOD PRESSURE: 80 MMHG | SYSTOLIC BLOOD PRESSURE: 132 MMHG

## 2021-03-10 PROCEDURE — 99072 ADDL SUPL MATRL&STAF TM PHE: CPT

## 2021-03-10 PROCEDURE — 99214 OFFICE O/P EST MOD 30 MIN: CPT

## 2021-03-10 PROCEDURE — 93000 ELECTROCARDIOGRAM COMPLETE: CPT

## 2021-03-10 NOTE — PHYSICAL EXAM
[Well Groomed] : well groomed [General Appearance - In No Acute Distress] : no acute distress [Eyelids - No Xanthelasma] : the eyelids demonstrated no xanthelasmas [FreeTextEntry1] : No JVD [Respiration, Rhythm And Depth] : normal respiratory rhythm and effort [Auscultation Breath Sounds / Voice Sounds] : lungs were clear to auscultation bilaterally [Heart Rate And Rhythm] : heart rate and rhythm were normal [Heart Sounds] : normal S1 and S2 [Gait - Sufficient For Exercise Testing] : the gait was sufficient for exercise testing [Skin Color & Pigmentation] : normal skin color and pigmentation [Oriented To Time, Place, And Person] : oriented to person, place, and time [Impaired Insight] : insight and judgment were intact

## 2021-03-10 NOTE — REVIEW OF SYSTEMS
[see HPI] : see HPI [Dyspnea on exertion] : not dyspnea during exertion [Chest Pain] : chest pain [Palpitations] : no palpitations [Negative] : Heme/Lymph

## 2021-03-10 NOTE — HISTORY OF PRESENT ILLNESS
[FreeTextEntry1] : Dang Finley is a 55 year old woman with a history of elevated cholesterol, and family history of premature coronary artery disease / myocardial infarction (father) who returns for cardiac examination after initial consultation with me in March 2019. At that time she underwent noninvasive cardiac testing (exercise ECG stress test and echocardiogram).  She describes an episode of chest pain that occurred at rest (seated) associated with dizziness and right arm pain last week-- symptoms lasted for approximately 20 minutes and have not recurred; unable to identify any clear exacerbating or alleviating factors.  She describes frequent aerobic exercise he never experienced a similar chest discomfort (or other chest pain) during physical exertion.\par \par I reviewed recent laboratory data. She was diagnosed with COVID-19 infection in early January 2021.  A lipid panel from November 2020 revealed a total cholesterol of 241, , HDL 61, and triglycerides 72.

## 2021-03-10 NOTE — DISCUSSION/SUMMARY
[FreeTextEntry1] : \par Chest pain: Patient describes nonanginal chest discomfort and today's ECG appears normal and similar to past tracings; I recommend exercise ECG stress test.\par \par Hyperlipidemia: Suboptimal cholesterol but 10 year risk of ASCVD remains low; I favor a trial of lifestyle modification (diet, weight loss, continued exercise) -- if no significant improvement then pharmacotherapy may be appropriate.

## 2021-03-29 ENCOUNTER — APPOINTMENT (OUTPATIENT)
Dept: CARDIOLOGY | Facility: CLINIC | Age: 56
End: 2021-03-29
Payer: COMMERCIAL

## 2021-03-29 PROCEDURE — 93015 CV STRESS TEST SUPVJ I&R: CPT

## 2021-03-29 PROCEDURE — 99072 ADDL SUPL MATRL&STAF TM PHE: CPT

## 2021-04-22 ENCOUNTER — TRANSCRIPTION ENCOUNTER (OUTPATIENT)
Age: 56
End: 2021-04-22

## 2021-05-04 ENCOUNTER — TRANSCRIPTION ENCOUNTER (OUTPATIENT)
Age: 56
End: 2021-05-04

## 2021-06-09 ENCOUNTER — TRANSCRIPTION ENCOUNTER (OUTPATIENT)
Age: 56
End: 2021-06-09

## 2021-06-10 ENCOUNTER — APPOINTMENT (OUTPATIENT)
Dept: INTERNAL MEDICINE | Facility: CLINIC | Age: 56
End: 2021-06-10
Payer: COMMERCIAL

## 2021-06-10 VITALS
HEIGHT: 60 IN | DIASTOLIC BLOOD PRESSURE: 90 MMHG | OXYGEN SATURATION: 99 % | WEIGHT: 141 LBS | TEMPERATURE: 99 F | HEART RATE: 64 BPM | SYSTOLIC BLOOD PRESSURE: 120 MMHG | BODY MASS INDEX: 27.68 KG/M2

## 2021-06-10 VITALS — SYSTOLIC BLOOD PRESSURE: 130 MMHG | DIASTOLIC BLOOD PRESSURE: 84 MMHG

## 2021-06-10 PROCEDURE — 99213 OFFICE O/P EST LOW 20 MIN: CPT

## 2021-06-10 NOTE — REVIEW OF SYSTEMS
[Headache] : headache [Dizziness] : no dizziness [Memory Loss] : no memory loss [Unsteady Walking] : no ataxia [Negative] : Gastrointestinal

## 2021-06-10 NOTE — PHYSICAL EXAM
[No Edema] : there was no peripheral edema [Soft] : abdomen soft [Non Tender] : non-tender [Normal] : normal gait, coordination grossly intact, no focal deficits and deep tendon reflexes were 2+ and symmetric [No Rash] : no rash [Normal Affect] : the affect was normal [Normal Mood] : the mood was normal

## 2021-06-10 NOTE — PLAN
[FreeTextEntry1] : Persistent headaches since J&J vaccine on 5/24.\par Send for CT head w/o contrast.\par If persists, consider neurology evaluation.

## 2021-06-10 NOTE — HISTORY OF PRESENT ILLNESS
[FreeTextEntry8] : Patient comes for an acute visit. \par \par She is here for evaluation of persistent headache and nausea after receiving J&J vaccine on 5/24. She had COVID back in 1/21. \par Head pain is all over scalp. Her headaches feel similar to her previous episode of COVID-19 infection.\par No numbness/tingling or extremity weakness. \par No dizziness, memory loss, or gait disturbance.\par She has tried Advil and Aspirin with little relief. \par

## 2021-06-17 ENCOUNTER — TRANSCRIPTION ENCOUNTER (OUTPATIENT)
Age: 56
End: 2021-06-17

## 2021-10-25 ENCOUNTER — TRANSCRIPTION ENCOUNTER (OUTPATIENT)
Age: 56
End: 2021-10-25

## 2022-01-23 ENCOUNTER — RESULT REVIEW (OUTPATIENT)
Age: 57
End: 2022-01-23

## 2022-02-10 ENCOUNTER — APPOINTMENT (OUTPATIENT)
Dept: INTERNAL MEDICINE | Facility: CLINIC | Age: 57
End: 2022-02-10
Payer: COMMERCIAL

## 2022-02-10 VITALS
HEIGHT: 59 IN | OXYGEN SATURATION: 99 % | SYSTOLIC BLOOD PRESSURE: 120 MMHG | HEART RATE: 61 BPM | DIASTOLIC BLOOD PRESSURE: 80 MMHG | WEIGHT: 135 LBS | BODY MASS INDEX: 27.21 KG/M2 | TEMPERATURE: 98.5 F

## 2022-02-10 DIAGNOSIS — R51.9 HEADACHE, UNSPECIFIED: ICD-10-CM

## 2022-02-10 PROCEDURE — 99213 OFFICE O/P EST LOW 20 MIN: CPT

## 2022-02-10 NOTE — HISTORY OF PRESENT ILLNESS
[FreeTextEntry1] : Discuss labs [de-identified] : Patient comes for follow-up.\par \par She had full lab panel by her GYN last month. She is concerned her lipids were a little higher. \par She lost 10 pounds by dieting and exercising. \par She continues to have persistent headaches daily, usually posteriorly. CT head in 6/21 was negative.

## 2022-02-10 NOTE — REVIEW OF SYSTEMS
[Headache] : headache [Negative] : Gastrointestinal [Dizziness] : no dizziness [Memory Loss] : no memory loss [Unsteady Walking] : no ataxia

## 2022-02-10 NOTE — PHYSICAL EXAM
[No Edema] : there was no peripheral edema [Soft] : abdomen soft [Non Tender] : non-tender [No Rash] : no rash [Normal] : normal gait, coordination grossly intact, no focal deficits and deep tendon reflexes were 2+ and symmetric [Normal Affect] : the affect was normal [Normal Mood] : the mood was normal

## 2022-02-10 NOTE — PLAN
[FreeTextEntry1] : Send for MRI head for persistent headaches. Consider neurology consultation.\par Labs reviewed from last month; LDL improved by 40 points, pt applauded for her efforts. Continue diet / exercise.\par \par RTO as scheduled next month for physical.

## 2022-02-25 NOTE — HISTORY OF PRESENT ILLNESS
[FreeTextEntry8] : Patient comes for an acute visit. \par \par She is here for evaluation of hair loss. Has noticed clumps of hair coming out in shower for past 1 month. She is concerned her hair is thinning. She tried biotin but stopped due to developing headaches. She has frequency of urine but no dysuria or hematuria. She recently saw her GYN last week, had blood work for B12 and thyroid panel and were reportedly normal. She has been under a lot of stress due to pandemic. She lost her job earlier this month, recently broke up with her boyfriend. She still has regular menses. 
[FreeTextEntry2] : gyn sono

## 2022-03-24 ENCOUNTER — APPOINTMENT (OUTPATIENT)
Dept: INTERNAL MEDICINE | Facility: CLINIC | Age: 57
End: 2022-03-24

## 2022-09-16 ENCOUNTER — NON-APPOINTMENT (OUTPATIENT)
Age: 57
End: 2022-09-16

## 2022-09-16 ENCOUNTER — APPOINTMENT (OUTPATIENT)
Dept: CARDIOLOGY | Facility: CLINIC | Age: 57
End: 2022-09-16

## 2022-09-16 VITALS
DIASTOLIC BLOOD PRESSURE: 70 MMHG | HEIGHT: 59 IN | WEIGHT: 128 LBS | HEART RATE: 56 BPM | SYSTOLIC BLOOD PRESSURE: 110 MMHG | BODY MASS INDEX: 25.8 KG/M2 | OXYGEN SATURATION: 99 %

## 2022-09-16 PROCEDURE — 99214 OFFICE O/P EST MOD 30 MIN: CPT | Mod: 25

## 2022-09-16 PROCEDURE — 93000 ELECTROCARDIOGRAM COMPLETE: CPT

## 2022-09-16 NOTE — CARDIOLOGY SUMMARY
[de-identified] : 9/16/22.  Sinus bradycardia 55 bpm. [de-identified] : 3/29/2021.  Completed 10 minutes 30 seconds of the Darvin protocol achieving 14 METs.  No chest pain with exercise.  Mild upsloping ST depressions in the inferior leads.  No exercise-induced arrhythmia. [de-identified] : 3/27/19.  Normal left ventricular size and systolic function with no segmental wall motion abnormalities. Normal diastolic function. Mild left atrial enlargement. Minimal mitral regurgitation. Mild tricuspid regurgitation. Normal estimated pulmonary artery pressure.

## 2022-09-16 NOTE — DISCUSSION/SUMMARY
[With Me] : with me [___ Year(s)] : in [unfilled] year(s) [FreeTextEntry1] : \par Chest pain:Atypical / non-anginal -- Single episode several weeks ago that has not recurred and was associated with a normal high-sensitivity troponin; in addition, she describes frequent and intense exercise with no exertional symptoms --  noncardiac etiology suspected;  I asked patient to contact me if chest pain recurs\par \par Hyperlipidemia:   Improved with diet  and weight loss;  reasonable to defer initiation of cholesterol-lowering therapy at this time with continued  efforts to maintain her current healthy habits.\par

## 2022-09-16 NOTE — HISTORY OF PRESENT ILLNESS
[FreeTextEntry1] : Dang Finley is a 57-year-old woman with a history of elevated cholesterol and family history of premature coronary artery disease / myocardial infarction (father) who returns for cardiac examination -our last encounter was approximately 18 months ago.  She was evaluated in the ER of [facility] several weeks ago after an episode of right-sided chest pain at rest that was accompanied by tingling sensations in both upper extremities.  An etiology for her discomfort was not identified; a high sensitivity troponin was normal (4).  Symptoms have not recurred.  She describes frequent, intense exercise that is not accompanied by angina. Since I last saw her she lost weight and LDL cholesterol has improved.

## 2022-09-28 ENCOUNTER — APPOINTMENT (OUTPATIENT)
Dept: INTERNAL MEDICINE | Facility: CLINIC | Age: 57
End: 2022-09-28

## 2023-11-06 ENCOUNTER — TRANSCRIPTION ENCOUNTER (OUTPATIENT)
Age: 58
End: 2023-11-06

## 2024-03-29 ENCOUNTER — APPOINTMENT (OUTPATIENT)
Dept: ORTHOPEDIC SURGERY | Facility: CLINIC | Age: 59
End: 2024-03-29
Payer: COMMERCIAL

## 2024-03-29 VITALS — WEIGHT: 135 LBS | BODY MASS INDEX: 26.85 KG/M2 | HEIGHT: 59.5 IN

## 2024-03-29 DIAGNOSIS — M54.50 LOW BACK PAIN, UNSPECIFIED: ICD-10-CM

## 2024-03-29 PROCEDURE — 99204 OFFICE O/P NEW MOD 45 MIN: CPT

## 2024-03-29 PROCEDURE — 72110 X-RAY EXAM L-2 SPINE 4/>VWS: CPT

## 2024-03-29 NOTE — ASSESSMENT
[FreeTextEntry1] : 58 F with LBP and LLE radic  We will also provide a prescription for anti-inflammatories.  Discussed major side effects of medication including but not limited to gastritis and acute kidney injury.  She was instructed to take with food and to discontinue use if stomach or esophageal pain developed. to start after MDP MDP  We will also prescribe Muscle Relaxants as needed.  Discussed side effects including but not limited to drowsiness and dizziness.  Discussed patient should not drive after taking the medicine. PT FU 6 weeks

## 2024-03-29 NOTE — HISTORY OF PRESENT ILLNESS
[Lower back] : lower back [Tingling] : tingling [de-identified] : 03/29/2024: Patient presents today for a new injury visit for the L-spine. States she started getting pain 2 weeks ago. Does not recall any specific injury. States n/t down the L leg. Pain radiate down left leg to calf. Hsa tried OTC Nsaids and Tylenol without relief.   No bowel or bladder symptoms. No fevers or chills.  Active goes to gy m daily.  Works as   [] : Post Surgical Visit: no [FreeTextEntry6] : numbness

## 2024-03-29 NOTE — IMAGING
[de-identified] : Spine: Inspection/Palpation: No tenderness to palpation throughout Cervical/thoracic/lumbar spine. No bony stepoffs, No lesions.  Gait: Nantalgic, able to perform bilateral toe and heel rise.     Neurologic:  Bilateral Lower Extremities 5/5 Iliopsoas/Quadriceps/Hamstrings/ Tibialis Anterior/ Gastrocnemius. Extensor Hallucis Longus/ Flexor Hallucis Longus except    Sensation intact to light touch L2-S1  Patellar/ Achilles reflex within normal limits.  X-ray Ap/Lateral/Flexion/Extension of lumbar spine were viewed and interpreted.  No spondylolisthesis.  L4-5 and L5-s1 disc height los. Coronal curvature consistent with spasm.

## 2024-05-10 ENCOUNTER — APPOINTMENT (OUTPATIENT)
Dept: ORTHOPEDIC SURGERY | Facility: CLINIC | Age: 59
End: 2024-05-10

## 2024-05-13 PROBLEM — J06.9 ACUTE URI: Status: RESOLVED | Noted: 2021-01-05 | Resolved: 2024-05-13

## 2024-05-13 PROBLEM — E78.2 HYPERLIPIDEMIA, MIXED: Status: ACTIVE | Noted: 2019-03-01

## 2024-05-13 PROBLEM — L03.119 CELLULITIS OF FOOT: Status: RESOLVED | Noted: 2018-06-12 | Resolved: 2024-05-13

## 2024-05-14 ENCOUNTER — APPOINTMENT (OUTPATIENT)
Dept: INTERNAL MEDICINE | Facility: CLINIC | Age: 59
End: 2024-05-14
Payer: COMMERCIAL

## 2024-05-14 ENCOUNTER — NON-APPOINTMENT (OUTPATIENT)
Age: 59
End: 2024-05-14

## 2024-05-14 VITALS
RESPIRATION RATE: 14 BRPM | DIASTOLIC BLOOD PRESSURE: 83 MMHG | TEMPERATURE: 98.1 F | WEIGHT: 135 LBS | HEART RATE: 62 BPM | OXYGEN SATURATION: 98 % | HEIGHT: 59.5 IN | SYSTOLIC BLOOD PRESSURE: 123 MMHG | BODY MASS INDEX: 26.85 KG/M2

## 2024-05-14 DIAGNOSIS — K21.9 GASTRO-ESOPHAGEAL REFLUX DISEASE W/OUT ESOPHAGITIS: ICD-10-CM

## 2024-05-14 DIAGNOSIS — L03.119 CELLULITIS OF UNSPECIFIED PART OF LIMB: ICD-10-CM

## 2024-05-14 DIAGNOSIS — E78.2 MIXED HYPERLIPIDEMIA: ICD-10-CM

## 2024-05-14 DIAGNOSIS — Z00.00 ENCOUNTER FOR GENERAL ADULT MEDICAL EXAMINATION W/OUT ABNORMAL FINDINGS: ICD-10-CM

## 2024-05-14 DIAGNOSIS — J06.9 ACUTE UPPER RESPIRATORY INFECTION, UNSPECIFIED: ICD-10-CM

## 2024-05-14 DIAGNOSIS — R07.89 OTHER CHEST PAIN: ICD-10-CM

## 2024-05-14 PROCEDURE — G2211 COMPLEX E/M VISIT ADD ON: CPT | Mod: NC,1L

## 2024-05-14 PROCEDURE — 93000 ELECTROCARDIOGRAM COMPLETE: CPT

## 2024-05-14 PROCEDURE — 99396 PREV VISIT EST AGE 40-64: CPT | Mod: 25

## 2024-05-14 PROCEDURE — 36415 COLL VENOUS BLD VENIPUNCTURE: CPT

## 2024-05-14 RX ORDER — MELOXICAM 15 MG/1
15 TABLET ORAL DAILY
Qty: 30 | Refills: 1 | Status: DISCONTINUED | COMMUNITY
Start: 2024-03-29 | End: 2024-05-14

## 2024-05-14 RX ORDER — METHYLPREDNISOLONE 4 MG/1
4 TABLET ORAL
Qty: 1 | Refills: 0 | Status: DISCONTINUED | COMMUNITY
Start: 2024-03-29 | End: 2024-05-14

## 2024-05-14 RX ORDER — CYCLOBENZAPRINE HYDROCHLORIDE 5 MG/1
5 TABLET, FILM COATED ORAL 3 TIMES DAILY
Qty: 30 | Refills: 0 | Status: DISCONTINUED | COMMUNITY
Start: 2024-03-29 | End: 2024-05-14

## 2024-05-14 NOTE — PHYSICAL EXAM
[No Acute Distress] : no acute distress [Well Nourished] : well nourished [Well-Appearing] : well-appearing [Normal Sclera/Conjunctiva] : normal sclera/conjunctiva [EOMI] : extraocular movements intact [Normal Outer Ear/Nose] : the outer ears and nose were normal in appearance [Normal TMs] : both tympanic membranes were normal [Supple] : supple [No Lymphadenopathy] : no lymphadenopathy [No Respiratory Distress] : no respiratory distress  [Clear to Auscultation] : lungs were clear to auscultation bilaterally [Normal Rate] : normal rate  [Regular Rhythm] : with a regular rhythm [Normal S1, S2] : normal S1 and S2 [No Murmur] : no murmur heard [No Carotid Bruits] : no carotid bruits [No Edema] : there was no peripheral edema [Soft] : abdomen soft [Non Tender] : non-tender [Normal Posterior Cervical Nodes] : no posterior cervical lymphadenopathy [Normal Anterior Cervical Nodes] : no anterior cervical lymphadenopathy [No Spinal Tenderness] : no spinal tenderness [No Joint Swelling] : no joint swelling [Grossly Normal Strength/Tone] : grossly normal strength/tone [No Rash] : no rash [Normal Gait] : normal gait [Normal Affect] : the affect was normal [Normal Mood] : the mood was normal [de-identified] : friendly [de-identified] : defer to GYN

## 2024-05-14 NOTE — PLAN
[FreeTextEntry1] : Check routine fasting labs. Discussed diet, exercise, and weight maintenance. She has lost weight. Flu shot always declined. Mammogram and PAP UTD. Colonoscopy 2016 - likely repeat 2026. Chest pain likely atypical from anxiety. She does not wish to try medication for anxiety. EKG today sinus bradycardia, no ischemic change. Follow up with cardiology. HLD - check fasting lipid panel. Check CT heart calcium score to determine need for statin.   RTO in 1 yr for annual exam or earlier PRN for acute care.

## 2024-05-14 NOTE — HEALTH RISK ASSESSMENT
[Excellent] : ~his/her~  mood as  excellent [No] : In the past 12 months have you used drugs other than those required for medical reasons? No [No falls in past year] : Patient reported no falls in the past year [0] : 2) Feeling down, depressed, or hopeless: Not at all (0) [Patient reported mammogram was normal] : Patient reported mammogram was normal [Patient reported PAP Smear was normal] : Patient reported PAP Smear was normal [Patient reported colonoscopy was normal] : Patient reported colonoscopy was normal [HIV test declined] : HIV test declined [Never] : Never [PHQ-2 Negative - No further assessment needed] : PHQ-2 Negative - No further assessment needed [de-identified] : running, weights [[id number]] : 0 [MammogramDate] : 07/23 [PapSmearDate] : 04/24 [ColonoscopyDate] : 10/16 [HepatitisCDate] : 09/17

## 2024-05-14 NOTE — HISTORY OF PRESENT ILLNESS
[FreeTextEntry1] : physical [de-identified] : Patient comes for an annual exam. Last seen in 2022.  She reports feeling well. She has improved the diet and follows exercise program. She lost 18 lb but gained back a few recently.  Has intermittent right-sided chest pain usually induced by stress at work. Sometimes feels anxious. She does not wish to try medication. Last saw cardiology in 9/22. Last stress test in 2021.

## 2024-05-14 NOTE — REVIEW OF SYSTEMS
[Negative] : Heme/Lymph [Recent Change In Weight] : ~T recent weight change [Chest Pain] : chest pain [Anxiety] : anxiety [Depression] : no depression [FreeTextEntry2] : recent 18 lb loss but gained few back

## 2024-05-19 ENCOUNTER — TRANSCRIPTION ENCOUNTER (OUTPATIENT)
Age: 59
End: 2024-05-19

## 2024-05-19 LAB
25(OH)D3 SERPL-MCNC: 27.3 NG/ML
ALBUMIN SERPL ELPH-MCNC: 4.7 G/DL
ALP BLD-CCNC: 79 U/L
ALT SERPL-CCNC: 18 U/L
ANION GAP SERPL CALC-SCNC: 11 MMOL/L
APPEARANCE: CLEAR
AST SERPL-CCNC: 17 U/L
BACTERIA: NEGATIVE /HPF
BASOPHILS # BLD AUTO: 0.06 K/UL
BASOPHILS NFR BLD AUTO: 0.8 %
BILIRUB SERPL-MCNC: 0.2 MG/DL
BILIRUBIN URINE: NEGATIVE
BLOOD URINE: NEGATIVE
BUN SERPL-MCNC: 16 MG/DL
CALCIUM SERPL-MCNC: 9.6 MG/DL
CAST: 0 /LPF
CHLORIDE SERPL-SCNC: 104 MMOL/L
CHOLEST SERPL-MCNC: 242 MG/DL
CO2 SERPL-SCNC: 24 MMOL/L
COLOR: YELLOW
CREAT SERPL-MCNC: 0.96 MG/DL
EGFR: 69 ML/MIN/1.73M2
EOSINOPHIL # BLD AUTO: 0.09 K/UL
EOSINOPHIL NFR BLD AUTO: 1.3 %
EPITHELIAL CELLS: 3 /HPF
ESTIMATED AVERAGE GLUCOSE: 108 MG/DL
GLUCOSE QUALITATIVE U: NEGATIVE MG/DL
GLUCOSE SERPL-MCNC: 86 MG/DL
HBA1C MFR BLD HPLC: 5.4 %
HCT VFR BLD CALC: 42.2 %
HDLC SERPL-MCNC: 63 MG/DL
HGB BLD-MCNC: 14.2 G/DL
IMM GRANULOCYTES NFR BLD AUTO: 0.3 %
KETONES URINE: NEGATIVE MG/DL
LDLC SERPL CALC-MCNC: 152 MG/DL
LEUKOCYTE ESTERASE URINE: ABNORMAL
LYMPHOCYTES # BLD AUTO: 2.46 K/UL
LYMPHOCYTES NFR BLD AUTO: 34.6 %
MAN DIFF?: NORMAL
MCHC RBC-ENTMCNC: 28.8 PG
MCHC RBC-ENTMCNC: 33.6 GM/DL
MCV RBC AUTO: 85.6 FL
MICROSCOPIC-UA: NORMAL
MONOCYTES # BLD AUTO: 0.45 K/UL
MONOCYTES NFR BLD AUTO: 6.3 %
NEUTROPHILS # BLD AUTO: 4.03 K/UL
NEUTROPHILS NFR BLD AUTO: 56.7 %
NITRITE URINE: NEGATIVE
NONHDLC SERPL-MCNC: 180 MG/DL
PH URINE: 6.5
PLATELET # BLD AUTO: 340 K/UL
POTASSIUM SERPL-SCNC: 4.2 MMOL/L
PROT SERPL-MCNC: 7.2 G/DL
PROTEIN URINE: NEGATIVE MG/DL
RBC # BLD: 4.93 M/UL
RBC # FLD: 13.8 %
RED BLOOD CELLS URINE: 1 /HPF
SODIUM SERPL-SCNC: 139 MMOL/L
SPECIFIC GRAVITY URINE: 1.02
TRIGL SERPL-MCNC: 156 MG/DL
TSH SERPL-ACNC: 1.51 UIU/ML
UROBILINOGEN URINE: 0.2 MG/DL
VIT B12 SERPL-MCNC: 609 PG/ML
WBC # FLD AUTO: 7.11 K/UL
WHITE BLOOD CELLS URINE: 2 /HPF

## 2024-05-25 ENCOUNTER — APPOINTMENT (OUTPATIENT)
Dept: CT IMAGING | Facility: CLINIC | Age: 59
End: 2024-05-25
Payer: COMMERCIAL

## 2024-05-25 PROCEDURE — 75571 CT HRT W/O DYE W/CA TEST: CPT

## 2024-05-31 ENCOUNTER — TRANSCRIPTION ENCOUNTER (OUTPATIENT)
Age: 59
End: 2024-05-31

## 2024-05-31 DIAGNOSIS — R93.1 ABNORMAL FINDINGS ON DIAGNOSTIC IMAGING OF HEART AND CORONARY CIRCULATION: ICD-10-CM

## 2024-06-03 ENCOUNTER — TRANSCRIPTION ENCOUNTER (OUTPATIENT)
Age: 59
End: 2024-06-03

## 2024-06-03 RX ORDER — ROSUVASTATIN CALCIUM 5 MG/1
5 TABLET, FILM COATED ORAL
Qty: 90 | Refills: 1 | Status: ACTIVE | COMMUNITY
Start: 2024-06-03 | End: 1900-01-01

## 2024-06-04 ENCOUNTER — NON-APPOINTMENT (OUTPATIENT)
Age: 59
End: 2024-06-04

## 2024-06-04 ENCOUNTER — TRANSCRIPTION ENCOUNTER (OUTPATIENT)
Age: 59
End: 2024-06-04

## 2024-07-09 ENCOUNTER — APPOINTMENT (OUTPATIENT)
Dept: CARDIOLOGY | Facility: CLINIC | Age: 59
End: 2024-07-09
Payer: COMMERCIAL

## 2024-07-09 ENCOUNTER — NON-APPOINTMENT (OUTPATIENT)
Age: 59
End: 2024-07-09

## 2024-07-09 VITALS
HEART RATE: 79 BPM | SYSTOLIC BLOOD PRESSURE: 122 MMHG | OXYGEN SATURATION: 99 % | WEIGHT: 138 LBS | DIASTOLIC BLOOD PRESSURE: 70 MMHG | BODY MASS INDEX: 27.41 KG/M2

## 2024-07-09 DIAGNOSIS — Z87.09 PERSONAL HISTORY OF OTHER DISEASES OF THE RESPIRATORY SYSTEM: ICD-10-CM

## 2024-07-09 DIAGNOSIS — L65.9 NONSCARRING HAIR LOSS, UNSPECIFIED: ICD-10-CM

## 2024-07-09 DIAGNOSIS — R06.02 SHORTNESS OF BREATH: ICD-10-CM

## 2024-07-09 DIAGNOSIS — Z87.898 PERSONAL HISTORY OF OTHER SPECIFIED CONDITIONS: ICD-10-CM

## 2024-07-09 DIAGNOSIS — I25.10 ATHEROSCLEROTIC HEART DISEASE OF NATIVE CORONARY ARTERY W/OUT ANGINA PECTORIS: ICD-10-CM

## 2024-07-09 DIAGNOSIS — I51.7 CARDIOMEGALY: ICD-10-CM

## 2024-07-09 DIAGNOSIS — I25.84 ATHEROSCLEROTIC HEART DISEASE OF NATIVE CORONARY ARTERY W/OUT ANGINA PECTORIS: ICD-10-CM

## 2024-07-09 DIAGNOSIS — E78.2 MIXED HYPERLIPIDEMIA: ICD-10-CM

## 2024-07-09 PROCEDURE — 99214 OFFICE O/P EST MOD 30 MIN: CPT | Mod: 25

## 2024-07-09 PROCEDURE — 93000 ELECTROCARDIOGRAM COMPLETE: CPT

## 2024-07-09 PROCEDURE — G2211 COMPLEX E/M VISIT ADD ON: CPT | Mod: NC

## 2024-08-12 ENCOUNTER — APPOINTMENT (OUTPATIENT)
Dept: CARDIOLOGY | Facility: CLINIC | Age: 59
End: 2024-08-12
Payer: COMMERCIAL

## 2024-08-12 DIAGNOSIS — I25.10 ATHEROSCLEROTIC HEART DISEASE OF NATIVE CORONARY ARTERY W/OUT ANGINA PECTORIS: ICD-10-CM

## 2024-08-12 PROCEDURE — 93306 TTE W/DOPPLER COMPLETE: CPT

## 2024-08-13 LAB
ALBUMIN SERPL ELPH-MCNC: 4.4 G/DL
ALP BLD-CCNC: 80 U/L
ALT SERPL-CCNC: 24 U/L
ANION GAP SERPL CALC-SCNC: 11 MMOL/L
AST SERPL-CCNC: 19 U/L
BILIRUB SERPL-MCNC: 0.4 MG/DL
BUN SERPL-MCNC: 12 MG/DL
CALCIUM SERPL-MCNC: 9.4 MG/DL
CHLORIDE SERPL-SCNC: 103 MMOL/L
CHOLEST SERPL-MCNC: 155 MG/DL
CO2 SERPL-SCNC: 26 MMOL/L
CREAT SERPL-MCNC: 0.93 MG/DL
EGFR: 71 ML/MIN/1.73M2
GLUCOSE SERPL-MCNC: 81 MG/DL
HDLC SERPL-MCNC: 61 MG/DL
LDLC SERPL CALC-MCNC: 82 MG/DL
NONHDLC SERPL-MCNC: 94 MG/DL
POTASSIUM SERPL-SCNC: 4.3 MMOL/L
PROT SERPL-MCNC: 6.6 G/DL
SODIUM SERPL-SCNC: 141 MMOL/L
TRIGL SERPL-MCNC: 61 MG/DL

## 2025-05-14 ENCOUNTER — NON-APPOINTMENT (OUTPATIENT)
Age: 60
End: 2025-05-14

## 2025-05-15 ENCOUNTER — NON-APPOINTMENT (OUTPATIENT)
Age: 60
End: 2025-05-15

## 2025-05-15 ENCOUNTER — APPOINTMENT (OUTPATIENT)
Dept: CARDIOLOGY | Facility: CLINIC | Age: 60
End: 2025-05-15
Payer: COMMERCIAL

## 2025-05-15 VITALS
HEART RATE: 55 BPM | HEIGHT: 59.5 IN | BODY MASS INDEX: 27.25 KG/M2 | OXYGEN SATURATION: 99 % | SYSTOLIC BLOOD PRESSURE: 114 MMHG | DIASTOLIC BLOOD PRESSURE: 86 MMHG | WEIGHT: 137 LBS

## 2025-05-15 DIAGNOSIS — R93.1 ABNORMAL FINDINGS ON DIAGNOSTIC IMAGING OF HEART AND CORONARY CIRCULATION: ICD-10-CM

## 2025-05-15 PROCEDURE — G2211 COMPLEX E/M VISIT ADD ON: CPT | Mod: NC

## 2025-05-15 PROCEDURE — 93000 ELECTROCARDIOGRAM COMPLETE: CPT

## 2025-05-15 PROCEDURE — 99214 OFFICE O/P EST MOD 30 MIN: CPT

## 2025-05-19 ENCOUNTER — APPOINTMENT (OUTPATIENT)
Dept: INTERNAL MEDICINE | Facility: CLINIC | Age: 60
End: 2025-05-19
Payer: COMMERCIAL

## 2025-05-19 VITALS
TEMPERATURE: 99.3 F | DIASTOLIC BLOOD PRESSURE: 76 MMHG | HEART RATE: 59 BPM | OXYGEN SATURATION: 98 % | WEIGHT: 138 LBS | BODY MASS INDEX: 27.45 KG/M2 | SYSTOLIC BLOOD PRESSURE: 110 MMHG | HEIGHT: 59.5 IN

## 2025-05-19 DIAGNOSIS — Z00.00 ENCOUNTER FOR GENERAL ADULT MEDICAL EXAMINATION W/OUT ABNORMAL FINDINGS: ICD-10-CM

## 2025-05-19 DIAGNOSIS — I25.10 ATHEROSCLEROTIC HEART DISEASE OF NATIVE CORONARY ARTERY W/OUT ANGINA PECTORIS: ICD-10-CM

## 2025-05-19 DIAGNOSIS — E55.9 VITAMIN D DEFICIENCY, UNSPECIFIED: ICD-10-CM

## 2025-05-19 DIAGNOSIS — Z80.49 FAMILY HISTORY OF MALIGNANT NEOPLASM OF OTHER GENITAL ORGANS: ICD-10-CM

## 2025-05-19 DIAGNOSIS — R00.2 PALPITATIONS: ICD-10-CM

## 2025-05-19 DIAGNOSIS — F41.9 ANXIETY DISORDER, UNSPECIFIED: ICD-10-CM

## 2025-05-19 DIAGNOSIS — E78.2 MIXED HYPERLIPIDEMIA: ICD-10-CM

## 2025-05-19 PROCEDURE — 99213 OFFICE O/P EST LOW 20 MIN: CPT | Mod: 25

## 2025-05-19 PROCEDURE — 99396 PREV VISIT EST AGE 40-64: CPT

## 2025-05-19 PROCEDURE — 36415 COLL VENOUS BLD VENIPUNCTURE: CPT

## 2025-05-19 RX ORDER — ALPRAZOLAM 0.25 MG/1
0.25 TABLET ORAL
Qty: 10 | Refills: 0 | Status: ACTIVE | COMMUNITY
Start: 2025-05-19 | End: 1900-01-01

## 2025-05-20 LAB
25(OH)D3 SERPL-MCNC: 21.2 NG/ML
ALBUMIN SERPL ELPH-MCNC: 4.6 G/DL
ALP BLD-CCNC: 90 U/L
ALT SERPL-CCNC: 31 U/L
ANION GAP SERPL CALC-SCNC: 13 MMOL/L
APPEARANCE: CLEAR
AST SERPL-CCNC: 25 U/L
BACTERIA: NEGATIVE /HPF
BASOPHILS # BLD AUTO: 0.06 K/UL
BASOPHILS NFR BLD AUTO: 0.8 %
BILIRUB SERPL-MCNC: 0.3 MG/DL
BILIRUBIN URINE: NEGATIVE
BLOOD URINE: NEGATIVE
BUN SERPL-MCNC: 14 MG/DL
CALCIUM SERPL-MCNC: 9.6 MG/DL
CAST: 1 /LPF
CHLORIDE SERPL-SCNC: 106 MMOL/L
CHOLEST SERPL-MCNC: 186 MG/DL
CO2 SERPL-SCNC: 22 MMOL/L
COLOR: YELLOW
CREAT SERPL-MCNC: 1.02 MG/DL
EGFRCR SERPLBLD CKD-EPI 2021: 63 ML/MIN/1.73M2
EOSINOPHIL # BLD AUTO: 0.04 K/UL
EOSINOPHIL NFR BLD AUTO: 0.5 %
EPITHELIAL CELLS: 1 /HPF
ESTIMATED AVERAGE GLUCOSE: 103 MG/DL
FOLATE SERPL-MCNC: 11.5 NG/ML
GLUCOSE QUALITATIVE U: NEGATIVE MG/DL
GLUCOSE SERPL-MCNC: 79 MG/DL
HBA1C MFR BLD HPLC: 5.2 %
HCT VFR BLD CALC: 42.7 %
HDLC SERPL-MCNC: 62 MG/DL
HGB BLD-MCNC: 13.9 G/DL
IMM GRANULOCYTES NFR BLD AUTO: 0.1 %
KETONES URINE: NEGATIVE MG/DL
LDLC SERPL-MCNC: 107 MG/DL
LEUKOCYTE ESTERASE URINE: ABNORMAL
LYMPHOCYTES # BLD AUTO: 2.15 K/UL
LYMPHOCYTES NFR BLD AUTO: 28.4 %
MAGNESIUM SERPL-MCNC: 2.4 MG/DL
MAN DIFF?: NORMAL
MCHC RBC-ENTMCNC: 27.8 PG
MCHC RBC-ENTMCNC: 32.6 G/DL
MCV RBC AUTO: 85.4 FL
MICROSCOPIC-UA: NORMAL
MONOCYTES # BLD AUTO: 0.38 K/UL
MONOCYTES NFR BLD AUTO: 5 %
NEUTROPHILS # BLD AUTO: 4.93 K/UL
NEUTROPHILS NFR BLD AUTO: 65.2 %
NITRITE URINE: NEGATIVE
NONHDLC SERPL-MCNC: 124 MG/DL
PH URINE: 6.5
PLATELET # BLD AUTO: 331 K/UL
POTASSIUM SERPL-SCNC: 4.4 MMOL/L
PROT SERPL-MCNC: 6.8 G/DL
PROTEIN URINE: NEGATIVE MG/DL
RBC # BLD: 5 M/UL
RBC # FLD: 14 %
RED BLOOD CELLS URINE: 0 /HPF
SODIUM SERPL-SCNC: 142 MMOL/L
SPECIFIC GRAVITY URINE: 1.02
TRIGL SERPL-MCNC: 95 MG/DL
TSH SERPL-ACNC: 1.16 UIU/ML
UROBILINOGEN URINE: 0.2 MG/DL
VIT B12 SERPL-MCNC: 687 PG/ML
WBC # FLD AUTO: 7.57 K/UL
WHITE BLOOD CELLS URINE: 2 /HPF

## 2025-06-17 ENCOUNTER — APPOINTMENT (OUTPATIENT)
Dept: CARDIOLOGY | Facility: CLINIC | Age: 60
End: 2025-06-17

## 2025-08-19 ENCOUNTER — APPOINTMENT (OUTPATIENT)
Dept: INTERNAL MEDICINE | Facility: CLINIC | Age: 60
End: 2025-08-19
Payer: COMMERCIAL

## 2025-08-19 VITALS
DIASTOLIC BLOOD PRESSURE: 80 MMHG | TEMPERATURE: 99 F | SYSTOLIC BLOOD PRESSURE: 136 MMHG | OXYGEN SATURATION: 97 % | RESPIRATION RATE: 14 BRPM | HEART RATE: 62 BPM

## 2025-08-19 DIAGNOSIS — J06.9 ACUTE UPPER RESPIRATORY INFECTION, UNSPECIFIED: ICD-10-CM

## 2025-08-19 PROCEDURE — 99214 OFFICE O/P EST MOD 30 MIN: CPT

## 2025-08-19 RX ORDER — AMOXICILLIN AND CLAVULANATE POTASSIUM 875; 125 MG/1; MG/1
875-125 TABLET, COATED ORAL
Qty: 14 | Refills: 0 | Status: ACTIVE | COMMUNITY
Start: 2025-08-19 | End: 1900-01-01

## 2025-08-20 LAB
RESP PATH DNA+RNA PNL NPH NAA+NON-PROBE: DETECTED
RV+EV RNA NPH QL NAA+NON-PROBE: DETECTED
SARS-COV-2 RNA RESP QL NAA+PROBE: NOT DETECTED